# Patient Record
Sex: FEMALE | Race: WHITE | NOT HISPANIC OR LATINO | URBAN - METROPOLITAN AREA
[De-identification: names, ages, dates, MRNs, and addresses within clinical notes are randomized per-mention and may not be internally consistent; named-entity substitution may affect disease eponyms.]

---

## 2017-09-28 ENCOUNTER — EMERGENCY (EMERGENCY)
Facility: HOSPITAL | Age: 27
LOS: 1 days | Discharge: HOME | End: 2017-09-28
Attending: EMERGENCY MEDICINE | Admitting: EMERGENCY MEDICINE
Payer: MEDICAID

## 2017-09-28 VITALS
WEIGHT: 104.06 LBS | RESPIRATION RATE: 16 BRPM | HEART RATE: 90 BPM | SYSTOLIC BLOOD PRESSURE: 130 MMHG | DIASTOLIC BLOOD PRESSURE: 79 MMHG | OXYGEN SATURATION: 100 % | TEMPERATURE: 99 F

## 2017-09-28 DIAGNOSIS — Z88.2 ALLERGY STATUS TO SULFONAMIDES: ICD-10-CM

## 2017-09-28 DIAGNOSIS — J32.9 CHRONIC SINUSITIS, UNSPECIFIED: ICD-10-CM

## 2017-09-28 DIAGNOSIS — R09.81 NASAL CONGESTION: ICD-10-CM

## 2017-09-28 PROCEDURE — 99283 EMERGENCY DEPT VISIT LOW MDM: CPT

## 2017-09-28 RX ORDER — AMOXICILLIN 250 MG/5ML
1 SUSPENSION, RECONSTITUTED, ORAL (ML) ORAL
Qty: 21 | Refills: 0 | OUTPATIENT
Start: 2017-09-28 | End: 2017-10-05

## 2017-09-28 RX ORDER — CETIRIZINE HYDROCHLORIDE 10 MG/1
1 TABLET ORAL
Qty: 10 | Refills: 0 | OUTPATIENT
Start: 2017-09-28 | End: 2017-10-08

## 2017-09-28 NOTE — ED PROVIDER NOTE - OBJECTIVE STATEMENT
27 y/o F with no significant pmhx coming in c/o congestion, body aches, throat pain and subjective fever since yesterday. Pt noted green mucus from sinuses today. Denies cough. No recent travel or sick contacts.    Pharmacy at [facility] [address]

## 2017-09-28 NOTE — ED PROVIDER NOTE - ENMT, MLM
Airway patent, Nasal mucosa clear. Mouth with normal mucosa. Throat has no vesicles, no oropharyngeal exudates and uvula is midline. Mild tenderness over maxillary sinuses.

## 2021-09-25 NOTE — ED PROVIDER NOTE - MEDICAL DECISION MAKING DETAILS
Pt here for vomiting since yesterday. Pt denies abd pain or diarrhea.    sinusitis, advised to wait 48 hrs if not improved start PO abx, outpatient followup.

## 2025-05-20 NOTE — ED PROVIDER NOTE - EYES, MLM
CLINICAL PHARMACY NOTE: MEDS TO BEDS    Total # of Prescriptions Filled: 3   The following medications were delivered to the patient:  Risperidone 1 mg  Risperidone 2 mg  Levetiracetam 500 mg    Additional Documentation: Emmanuelle LINDO picked up in pharmacy     Clear bilaterally.